# Patient Record
(demographics unavailable — no encounter records)

---

## 2025-01-08 NOTE — PHYSICAL EXAM
[Alert] : alert [Well Nourished] : well nourished [No Acute Distress] : no acute distress [Well Developed] : well developed [No Neck Mass] : no neck mass was observed [No LAD] : no lymphadenopathy [Normal Rate and Effort] : normal respiratory rhythm and effort [No Crackles] : no crackles [Normal Rate] : heart rate was normal  [Normal S1, S2] : normal S1 and S2 [No murmur] : no murmur [Regular Rhythm] : with a regular rhythm [Normal Cervical Lymph Nodes] : cervical [No Rash] : no rash [Normal Mood] : mood was normal [Judgment and Insight Age Appropriate] : judgement and insight is age appropriate [Wheezing] : no wheezing was heard

## 2025-01-08 NOTE — ASSESSMENT
[FreeTextEntry1] : Facial angioedema with flaky skin upon resolution - possible contact allergic reaction  No food avoidance  RV patch testing  Xyzal 5 mg BID for any recurrent symptoms  C1 esterase inhibitor functional and quantitative

## 2025-01-08 NOTE — SOCIAL HISTORY
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

## 2025-01-08 NOTE — HISTORY OF PRESENT ILLNESS
[de-identified] : Patient reports about 6 weeks ago - she was at work she noted swelling of her face with flushing sensation - she went home and the swelling worsened - ears swollen - she took Benadryl - she took Medrol 36 mg with no change in her symptoms.    She went to the ER - Benadryl and steroids - treated with prednisone x 5 days with EpiPen RX.     Patient has not had any swelling episodes in the past.   Her skin was very flaky with resolution.   No new products applied to her face.   She has not been avoiding any foods over the 6 weeks.     No history of food allergies.     Swelling lasted about 2-3 days - the scaling of the skin lasted for 2-3 days